# Patient Record
Sex: MALE | Race: WHITE | Employment: FULL TIME | URBAN - METROPOLITAN AREA
[De-identification: names, ages, dates, MRNs, and addresses within clinical notes are randomized per-mention and may not be internally consistent; named-entity substitution may affect disease eponyms.]

---
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
## 2024-02-14 NOTE — PROGRESS NOTES
Criss Felton returns today to follow-up his left clavicle. He is doing well and reports no pain. He has been out of the sling. He is hoping to resume sports. General Exam:    Vitals: Height 6' 1\" (1.854 m), weight 165 lb (74.8 kg). Constitutional: Patient is adequately groomed with no evidence of malnutrition  Mental Status: The patient is oriented to time, place and person. The patient's mood and affect are appropriate. Left shoulder today has trace prominence over the midshaft clavicle but no tenderness or sharp discomfort. He has good strength throughout the cuff and no pain with cross body adduction and no pain with palpation. 2 view x-rays left clavicle obtained today in the office and interpreted by me demonstrate:  Abundant callus formation about the midshaft clavicle without displacement. Assessment: Healed left clavicle fracture. Plan: At this point he can resume sports as tolerated. If he remains uncomfortable or he has discomfort he can hold off. May need to understand there remains some level of risk associated with contact and risk of refracture.     Follow-up with me on an as-needed basis Discharge Instructions for Abdominal Paracentesis     An abdominal paracentesis , also known as an ascites fluid tap,occurs when a needle is inserted into the abdominal area to remove fluid. Usually, there is very little fluid in the abdominal cavity. When fluid does accumulate, a paracentesis may be done to remove the excess fluid or to take samples for testing.   There are several reasons to perform this procedure including internal bleeding, infection, or liver, pancreatic and peritoneal disorders. Extra fluid may also be removed when it leads to breathing problems and/or pain.   Depending on the reason for removing the fluid, anywhere from a few milliliters to several liters may be removed in one procedure. You may need intravenous fluids to prevent your blood pressure from getting too low and to prevent shock. The fluid may be sent to the lab for testing. This procedure can take from 10-15 minutes.   No hospital stay is needed, if the procedure is done for diagnosis. If you have a lot of fluid or are having trouble breathing, you may need to stay in the hospital.   Steps to Take   Home Care    Rest as needed.    Keep the insertion area clean and dry.    Change the bandage as needed.    Diet    Maintain your normal diet.    Physical Activity    After the procedure, return to your normal activities. If you are feeling tired, rest as needed.    Do not drive unless your doctor has given you permission to do so.    Medications    When taking medications, it's important to:   Take your medication as directednot more, not less, not at a different time.   Do not stop taking them without consulting your healthcare provider.   Don't share them with anyone else.   Know what effects and side effects to expect, and report them to your healthcare provider.   If you are taking more than one drug, even if it is an over-the-counter medication, herb, or dietary supplement, be sure to check with a physician or pharmacist about